# Patient Record
Sex: MALE | Race: WHITE | NOT HISPANIC OR LATINO | Employment: STUDENT | URBAN - METROPOLITAN AREA
[De-identification: names, ages, dates, MRNs, and addresses within clinical notes are randomized per-mention and may not be internally consistent; named-entity substitution may affect disease eponyms.]

---

## 2024-02-23 ENCOUNTER — OFFICE VISIT (OUTPATIENT)
Dept: FAMILY MEDICINE | Facility: CLINIC | Age: 24
End: 2024-02-23
Payer: OTHER GOVERNMENT

## 2024-02-23 VITALS
WEIGHT: 158.7 LBS | OXYGEN SATURATION: 97 % | TEMPERATURE: 98 F | HEART RATE: 85 BPM | SYSTOLIC BLOOD PRESSURE: 110 MMHG | DIASTOLIC BLOOD PRESSURE: 74 MMHG | RESPIRATION RATE: 16 BRPM

## 2024-02-23 DIAGNOSIS — G43.009 MIGRAINE WITHOUT AURA AND WITHOUT STATUS MIGRAINOSUS, NOT INTRACTABLE: ICD-10-CM

## 2024-02-23 DIAGNOSIS — Z76.89 ENCOUNTER TO ESTABLISH CARE: Primary | ICD-10-CM

## 2024-02-23 PROCEDURE — 99203 OFFICE O/P NEW LOW 30 MIN: CPT | Performed by: PHYSICIAN ASSISTANT

## 2024-02-23 ASSESSMENT — PAIN SCALES - GENERAL: PAINLEVEL: NO PAIN (0)

## 2024-02-23 NOTE — PATIENT INSTRUCTIONS
-Magnesium 400 mg nightly and Riboflavin (B2) 400 mg nightly for migraine prevention.   -CoQenzyme 10 additional supplement   -Option in the future to pursue as needed migraine abortive therapy (Triptans)  like Maxalt.

## 2024-02-23 NOTE — PROGRESS NOTES
Assessment & Plan     Encounter to establish care  Plan for annual physical summer/fall.  Labs at that time.  Continue healthy diet and exercise.    Migraine without aura and without status migrainosus, not intractable  Suspect underlying migraine headaches.  Discussed monitoring for specific triggers.  Healthy diet and exercise.  Stress management.  Sleep.  Reviewed potential treatments, preventative treatments, as well as abortive therapies.  Opts for natural treatments.  Magnesium 400 mg, riboflavin 400 mg to hopefully reduce frequency.  He will reach out if he wishes to proceed abortive therapies such as Maxalt as we discussed.  Recommended follow-up with eye doctor.      30 minutes spent by me on the date of the encounter on chart review, review of test results, interpretation of tests, patient visit, and documentation         Return in about 6 months (around 8/23/2024) for follow-up per plan.    The likelihood of other entities in the differential is insufficient to justify any further testing for them at this time. This was explained to the patient. The patient was advised that persistent or worsening symptoms would require further evaluation. Patient advised to call the office and if unable to reach to go to the emergency room if they develop any new or worsening symptoms. Expressed understanding and agreement with above stated plan.     JUANA Joseph [facility] DANIELLE    Pritesh Allen Aidan Caba is a 23 year old male presenting for the following health issues:  Patient presents with:  Establish Care    Here today to establish care. From [place] originally. Recently moved to [place] with wife from [place]. Was [employer]. Now at [facility]. Considering chiro.     -Only concern today is evaluation of headaches/migraines.  This started in 2019/2020.  Does have family history significant for migraine in his siblings.    Notes bilateral head pressure.   Associated eyes/vision changes.  Unable to focus.  Pain behind eyes.  No loss of vision.  Associated head/neck tightness.  Takes Excedrin which helps with pain but the difficulty focusing persist.  Was previously getting the symptoms 2-4 times per week however now is approximately once weekly.  Has tried massage, chiropractor, and acupuncture.      Review of Systems   Constitutional, HEENT, cardiovascular, pulmonary, GI, , musculoskeletal, neuro, skin, endocrine and psych systems are negative, except as otherwise noted.      Objective    /74 (BP Location: Right arm, Patient Position: Sitting, Cuff Size: Adult Regular)   Pulse 85   Temp 98  F (36.7  C) (Oral)   Resp 16   Wt 72 kg (158 lb 11.2 oz)   SpO2 97%   Data Unavailable  158 lbs 11.2 oz    Physical Exam   GENERAL: healthy, alert and no distress  EYES: Eyes grossly normal to inspection, PERRL and conjunctivae and sclerae normal  RESP: lungs clear to auscultation - no rales, rhonchi or wheezes  CV: regular rate and rhythm, normal S1 S2, no S3 or S4, no murmur, click or rub, no peripheral edema and peripheral pulses strong  MS: no gross musculoskeletal defects noted, no edema  SKIN: no suspicious lesions or rashes  NEURO: Cranial nerves II to XII grossly intact.  Negative Romberg.  Gait stable.  Normal strength and tone, mentation intact and speech normal  PSYCH: mentation appears normal, affect normal/bright      Answers submitted by the patient for this visit:  General Questionnaire (Submitted on 2/22/2024)  Chief Complaint: Chronic problems general questions HPI Form  What is the reason for your visit today? : Meet my new primary care provider and bring up migraine issues.  How many servings of fruits and vegetables do you eat daily?: 2-3  On average, how many sweetened beverages do you drink each day (Examples: soda, juice, sweet tea, etc.  Do NOT count diet or artificially sweetened beverages)?: 2  How many minutes a day do you exercise enough to  make your heart beat faster?: 20 to 29  How many days a week do you exercise enough to make your heart beat faster?: 4  How many days per week do you miss taking your medication?: 0

## 2024-03-10 ENCOUNTER — HEALTH MAINTENANCE LETTER (OUTPATIENT)
Age: 24
End: 2024-03-10

## 2024-05-15 ENCOUNTER — ANCILLARY PROCEDURE (OUTPATIENT)
Dept: GENERAL RADIOLOGY | Facility: CLINIC | Age: 24
End: 2024-05-15
Attending: PHYSICIAN ASSISTANT
Payer: OTHER GOVERNMENT

## 2024-05-15 ENCOUNTER — OFFICE VISIT (OUTPATIENT)
Dept: FAMILY MEDICINE | Facility: CLINIC | Age: 24
End: 2024-05-15
Payer: OTHER GOVERNMENT

## 2024-05-15 VITALS
DIASTOLIC BLOOD PRESSURE: 74 MMHG | BODY MASS INDEX: 23.25 KG/M2 | TEMPERATURE: 98.3 F | SYSTOLIC BLOOD PRESSURE: 112 MMHG | HEIGHT: 69 IN | OXYGEN SATURATION: 96 % | HEART RATE: 83 BPM | RESPIRATION RATE: 16 BRPM | WEIGHT: 157 LBS

## 2024-05-15 DIAGNOSIS — M25.561 CHRONIC PAIN OF BOTH KNEES: Primary | ICD-10-CM

## 2024-05-15 DIAGNOSIS — M25.562 CHRONIC PAIN OF BOTH KNEES: Primary | ICD-10-CM

## 2024-05-15 DIAGNOSIS — M25.561 CHRONIC PAIN OF BOTH KNEES: ICD-10-CM

## 2024-05-15 DIAGNOSIS — M25.562 CHRONIC PAIN OF BOTH KNEES: ICD-10-CM

## 2024-05-15 DIAGNOSIS — E55.9 VITAMIN D DEFICIENCY: ICD-10-CM

## 2024-05-15 DIAGNOSIS — G89.29 CHRONIC PAIN OF BOTH KNEES: Primary | ICD-10-CM

## 2024-05-15 DIAGNOSIS — G89.29 CHRONIC PAIN OF BOTH KNEES: ICD-10-CM

## 2024-05-15 DIAGNOSIS — R23.3 ABNORMAL BRUISING: ICD-10-CM

## 2024-05-15 LAB
APTT PPP: 33 SECONDS (ref 22–38)
BASOPHILS # BLD AUTO: 0 10E3/UL (ref 0–0.2)
BASOPHILS NFR BLD AUTO: 0 %
EOSINOPHIL # BLD AUTO: 0.1 10E3/UL (ref 0–0.7)
EOSINOPHIL NFR BLD AUTO: 2 %
ERYTHROCYTE [DISTWIDTH] IN BLOOD BY AUTOMATED COUNT: 12.2 % (ref 10–15)
HCT VFR BLD AUTO: 45.1 % (ref 40–53)
HGB BLD-MCNC: 15.4 G/DL (ref 13.3–17.7)
IMM GRANULOCYTES # BLD: 0 10E3/UL
IMM GRANULOCYTES NFR BLD: 0 %
INR PPP: 1.02 (ref 0.85–1.15)
LYMPHOCYTES # BLD AUTO: 1.7 10E3/UL (ref 0.8–5.3)
LYMPHOCYTES NFR BLD AUTO: 25 %
MCH RBC QN AUTO: 29.6 PG (ref 26.5–33)
MCHC RBC AUTO-ENTMCNC: 34.1 G/DL (ref 31.5–36.5)
MCV RBC AUTO: 87 FL (ref 78–100)
MONOCYTES # BLD AUTO: 0.5 10E3/UL (ref 0–1.3)
MONOCYTES NFR BLD AUTO: 8 %
NEUTROPHILS # BLD AUTO: 4.4 10E3/UL (ref 1.6–8.3)
NEUTROPHILS NFR BLD AUTO: 66 %
PLATELET # BLD AUTO: 278 10E3/UL (ref 150–450)
RBC # BLD AUTO: 5.21 10E6/UL (ref 4.4–5.9)
WBC # BLD AUTO: 6.7 10E3/UL (ref 4–11)

## 2024-05-15 PROCEDURE — 73562 X-RAY EXAM OF KNEE 3: CPT | Mod: TC | Performed by: RADIOLOGY

## 2024-05-15 PROCEDURE — 85025 COMPLETE CBC W/AUTO DIFF WBC: CPT | Performed by: PHYSICIAN ASSISTANT

## 2024-05-15 PROCEDURE — 36415 COLL VENOUS BLD VENIPUNCTURE: CPT | Performed by: PHYSICIAN ASSISTANT

## 2024-05-15 PROCEDURE — 85610 PROTHROMBIN TIME: CPT | Performed by: PHYSICIAN ASSISTANT

## 2024-05-15 PROCEDURE — 82306 VITAMIN D 25 HYDROXY: CPT | Performed by: PHYSICIAN ASSISTANT

## 2024-05-15 PROCEDURE — 85730 THROMBOPLASTIN TIME PARTIAL: CPT | Performed by: PHYSICIAN ASSISTANT

## 2024-05-15 PROCEDURE — 99213 OFFICE O/P EST LOW 20 MIN: CPT | Performed by: PHYSICIAN ASSISTANT

## 2024-05-15 ASSESSMENT — PAIN SCALES - GENERAL: PAINLEVEL: NO PAIN (1)

## 2024-05-15 NOTE — PROGRESS NOTES
Assessment & Plan     Chronic pain of both knees  Given chronic bilateral knee pain following exertion for approximately 5 years reasonable to check bilateral x-rays to rule out any structural issues.  Based on his symptoms really suspect this is related to patellar tendinitis based on anterior location and often related to overuse type issues.  Lower suspicion for IT band syndrome.  Does not necessarily explain bruising.  Discussed options to see physical therapy or to pursue orthopedics given how long the symptoms have occurred.  Will discuss following results of x-rays.  - XR Knee Bilateral 3 Views    Abnormal bruising  Bruising of bilateral knees without trauma or inciting injury?  CBC, PT/PTT.  Low suspicion for underlying clotting/bleeding disorder.  - CBC with platelets and differential  - Partial thromboplastin time  - INR    Vitamin D deficiency  History of vitamin D deficiency.  Currently not supplementing.  Will check levels today.  - Vitamin D Deficiency    20 minutes spent by me on the date of the encounter on chart review, review of test results, interpretation of tests, patient visit, and documentation        No follow-ups on file.    The likelihood of other entities in the differential is insufficient to justify any further testing for them at this time. This was explained to the patient. The patient was advised that persistent or worsening symptoms would require further evaluation. Patient advised to call the office and if unable to reach to go to the emergency room if they develop any new or worsening symptoms. Expressed understanding and agreement with above stated plan.     JUANA Joseph [facility]   Alejandro Aidan Caba is a 23 year old male presenting for the following health issues:  Patient presents with:  Knee Problem: Bruising and pain when walking long distances, left is worse than right knee; progressively getting worse since 2020    Here  "today for evaluation of bilateral knee pain as well as bruising.  Has noted knee pain following walking/running/hiking longer distances for approximately 5 years.  Getting progressively worse.  Knee pain described as anterior.  A part of the [employer] team -recent training exercises without trauma to his knees however noted bruising bilaterally.  This he notes on occasion despite no actual trauma to the affected area.  Denies history of any bleeding/bruising disorders.  No easy bleeding of gums etc.     Review of Systems   Constitutional, HEENT, cardiovascular, pulmonary, GI, , musculoskeletal, neuro, skin, endocrine and psych systems are negative, except as otherwise noted.      Objective    /74 (BP Location: Left arm, Patient Position: Sitting, Cuff Size: Adult Regular)   Pulse 83   Temp 98.3  F (36.8  C)   Resp 16   Ht 1.753 m (5' 9\")   Wt 71.2 kg (157 lb)   SpO2 96%   BMI 23.18 kg/m    5' 9\"  157 lbs 0 oz    Physical Exam   GENERAL: healthy, alert and no distress  EYES: Eyes grossly normal to inspection, PERRL and conjunctivae and sclerae normal  NECK: no adenopathy, no asymmetry, masses, or scars and thyroid normal to palpation  RESP: lungs clear to auscultation - no rales, rhonchi or wheezes  CV: regular rate and rhythm, normal S1 S2, no S3 or S4, no murmur, click or rub, no peripheral edema and peripheral pulses strong  MS: no gross musculoskeletal defects noted, no edema.  Bilateral knees with full range of motion.  No hemarthrosis or significant effusions.  No ecchymosis noted.  Anterior and posterior draw negative.  Negative Cruz's provocative testing.  Ambulating appropriately without issue.  SKIN: no suspicious lesions or rashes  NEURO: Normal strength and tone, mentation intact and speech normal  PSYCH: mentation appears normal, affect normal/bright      " [facility]...

## 2024-05-16 LAB — VIT D+METAB SERPL-MCNC: 24 NG/ML (ref 20–50)

## 2024-05-16 NOTE — RESULT ENCOUNTER NOTE
Constantine Allen,    Blood counts as well as clotting factors are normal which is reassuring.  Awaiting vitamin D level.  Bilateral knee x-rays are unremarkable.  Did send a referral for you to see Dr. Norris to evaluate this further.  You should get a call in the next 2 days but if you do not hear from them give this number to call:  [phone].

## 2024-05-16 NOTE — ADDENDUM NOTE
Addended by: CLYDE AGUIRRE on: 5/16/2024 07:39 AM     Modules accepted: Orders     This was a shared visit with the KIRSTEN. I reviewed and verified the documentation.

## 2025-03-16 ENCOUNTER — HEALTH MAINTENANCE LETTER (OUTPATIENT)
Age: 25
End: 2025-03-16